# Patient Record
Sex: FEMALE | Race: WHITE | NOT HISPANIC OR LATINO
[De-identification: names, ages, dates, MRNs, and addresses within clinical notes are randomized per-mention and may not be internally consistent; named-entity substitution may affect disease eponyms.]

---

## 2019-07-26 ENCOUNTER — RECORDS - HEALTHEAST (OUTPATIENT)
Dept: ADMINISTRATIVE | Facility: OTHER | Age: 54
End: 2019-07-26

## 2019-07-26 ENCOUNTER — HOSPITAL ENCOUNTER (OUTPATIENT)
Dept: ULTRASOUND IMAGING | Facility: CLINIC | Age: 54
Discharge: HOME | End: 2019-07-26
Attending: PHYSICIAN ASSISTANT

## 2019-07-26 DIAGNOSIS — S89.90XA INJURY OF CALF: ICD-10-CM

## 2024-12-07 ENCOUNTER — HEALTH MAINTENANCE LETTER (OUTPATIENT)
Age: 59
End: 2024-12-07

## 2024-12-13 ENCOUNTER — HOSPITAL ENCOUNTER (EMERGENCY)
Facility: CLINIC | Age: 59
Discharge: HOME | End: 2024-12-13
Attending: EMERGENCY MEDICINE | Admitting: EMERGENCY MEDICINE
Payer: COMMERCIAL

## 2024-12-13 VITALS
OXYGEN SATURATION: 99 % | SYSTOLIC BLOOD PRESSURE: 162 MMHG | HEART RATE: 50 BPM | TEMPERATURE: 97.9 F | RESPIRATION RATE: 20 BRPM | DIASTOLIC BLOOD PRESSURE: 71 MMHG

## 2024-12-13 DIAGNOSIS — L28.2 PRURITIC RASH: ICD-10-CM

## 2024-12-13 PROBLEM — I25.10 PRECLINICAL CORONARY ARTERY DISEASE: Status: ACTIVE | Noted: 2018-11-23

## 2024-12-13 PROBLEM — Z86.19 HX OF COLD SORES: Status: ACTIVE | Noted: 2020-01-21

## 2024-12-13 PROBLEM — E78.00 PURE HYPERCHOLESTEROLEMIA: Chronic | Status: ACTIVE | Noted: 2018-11-06

## 2024-12-13 PROBLEM — N95.2 VAGINAL ATROPHY: Status: ACTIVE | Noted: 2021-02-18

## 2024-12-13 PROCEDURE — 250N000013 HC RX MED GY IP 250 OP 250 PS 637: Performed by: EMERGENCY MEDICINE

## 2024-12-13 PROCEDURE — 250N000012 HC RX MED GY IP 250 OP 636 PS 637: Performed by: EMERGENCY MEDICINE

## 2024-12-13 PROCEDURE — 99284 EMERGENCY DEPT VISIT MOD MDM: CPT

## 2024-12-13 RX ORDER — HYDROXYZINE HYDROCHLORIDE 25 MG/1
50 TABLET, FILM COATED ORAL ONCE
Status: COMPLETED | OUTPATIENT
Start: 2024-12-13 | End: 2024-12-13

## 2024-12-13 RX ORDER — PREDNISONE 20 MG/1
60 TABLET ORAL ONCE
Status: COMPLETED | OUTPATIENT
Start: 2024-12-13 | End: 2024-12-13

## 2024-12-13 RX ORDER — PREDNISONE 20 MG/1
TABLET ORAL
Qty: 10 TABLET | Refills: 0 | Status: SHIPPED | OUTPATIENT
Start: 2024-12-13

## 2024-12-13 RX ORDER — PRAVASTATIN SODIUM 20 MG
20 TABLET ORAL AT BEDTIME
COMMUNITY

## 2024-12-13 RX ORDER — LEVOCETIRIZINE DIHYDROCHLORIDE 5 MG/1
5 TABLET, FILM COATED ORAL ONCE
Status: COMPLETED | OUTPATIENT
Start: 2024-12-13 | End: 2024-12-13

## 2024-12-13 RX ORDER — HYDROXYZINE HYDROCHLORIDE 25 MG/1
50 TABLET, FILM COATED ORAL EVERY 6 HOURS PRN
Qty: 30 TABLET | Refills: 0 | Status: SHIPPED | OUTPATIENT
Start: 2024-12-13

## 2024-12-13 RX ADMIN — LEVOCETIRIZINE DIHYDROCHLORIDE 5 MG: 5 TABLET ORAL at 02:44

## 2024-12-13 RX ADMIN — PREDNISONE 60 MG: 20 TABLET ORAL at 02:44

## 2024-12-13 RX ADMIN — HYDROXYZINE HYDROCHLORIDE 50 MG: 25 TABLET ORAL at 02:44

## 2024-12-13 ASSESSMENT — COLUMBIA-SUICIDE SEVERITY RATING SCALE - C-SSRS
1. IN THE PAST MONTH, HAVE YOU WISHED YOU WERE DEAD OR WISHED YOU COULD GO TO SLEEP AND NOT WAKE UP?: NO
6. HAVE YOU EVER DONE ANYTHING, STARTED TO DO ANYTHING, OR PREPARED TO DO ANYTHING TO END YOUR LIFE?: NO
2. HAVE YOU ACTUALLY HAD ANY THOUGHTS OF KILLING YOURSELF IN THE PAST MONTH?: NO

## 2024-12-13 NOTE — ED TRIAGE NOTES
Pt woke up around 2300 with swelling to her eyes and cheeks, noticed that her face was itchy. States that she's had a cold since Tuesday and noticed her neck felt itchy yesterday (external, not internal). Denies any SOB or changes to her voice, no GI sxs. No new medications, lotions or detergents.     Pt took benedryl 50mg around 2330 and states that her eyes feel less swollen now.     ABCs intact.     Triage Assessment (Adult)       Row Name 12/13/24 0150          Triage Assessment    Airway WDL WDL        Respiratory WDL    Respiratory WDL WDL        Skin Circulation/Temperature WDL    Skin Circulation/Temperature WDL X  redness to cheeks and eyes        Cardiac WDL    Cardiac WDL WDL        Peripheral/Neurovascular WDL    Peripheral Neurovascular WDL WDL        Cognitive/Neuro/Behavioral WDL    Cognitive/Neuro/Behavioral WDL WDL

## 2024-12-13 NOTE — ED PROVIDER NOTES
EMERGENCY DEPARTMENT ENCOUNTER      NAME: Susan Pantoja  AGE: 59 year old female  YOB: 1965  MRN: [id number]  EVALUATION DATE & TIME: No admission date for patient encounter.    PCP: Teresa Segura    ED PROVIDER: Norris Licona M.D.      Chief Complaint   Patient presents with    Allergic Reaction         IMPRESSION  1. Pruritic rash        PLAN  - prednisone 40mg daily x5 days  - Benadryl/Atarax for itching  - close PCP follow up  - discharge to home    ED COURSE & MEDICAL DECISION MAKING    ED Course as of 12/13/24 0243   Fri Dec 13, 2024   0217 Patient seen in the waiting room due to boarding crisis.   0230 59yoF with history of HLD presenting for evaluation of itchy rash to her face & neck. Reports several days of mild URI symptoms and took cold medicine earlier this evening. No new foods, soaps, detergents. Awoke tonight with new itchy rash to her face & neck; no difficulty breathing or swallowing. Took Benadryl with mild relief. Called RN line and directed to the ED.    Mild erythema to cheeks & anterior neck with no clear hives, no angioedema, handling secretions without difficulty, clear lungs, normal work of breathing, benign abdomen, no meningismus, overall well-appearing.    Suspect allergic reaction; does not meet anaphylaxis criteria. Given prednisone, Atarax, Xyzal here now with improvement; will continue as outpatient. Patient able to tolerate PO and walk without difficulty. Patient comfortable with discharge at this time. Return precautions and need for PCP follow up discussed and understood. No further questions at the time of discharge.       --------------------------------------------------------------------------------   --------------------------------------------------------------------------------     2:20 AM I met with the patient for the initial interview and physical examination. Discussed plan for treatment and workup in the ED. We discussed plan  for discharge and all questions were answered.         This patient involved a high degree of complexity in medical decision making, as significant risks were present and assessed. Recent encounters & results in medical record reviewed by me.    All workup (i.e. any EKG/labs/imaging as per charting below) reviewed and independently interpreted by me. See respective sections for details.    Broad differential considered for this patient, including but not limited to:  allergic reaction, anaphylaxis, scabies, cellulitis, necrotizing fasciitis, sepsis, contact dermatitis      See additional MDM below if interested.      MEDICATIONS GIVEN IN THE EMERGENCY DEPARTMENT  Medications   levocetirizine (XYZAL) tablet 5 mg (has no administration in time range)   predniSONE (DELTASONE) tablet 60 mg (has no administration in time range)   hydrOXYzine HCl (ATARAX) tablet 50 mg (has no administration in time range)       NEW PRESCRIPTIONS STARTED AT TODAY'S ER VISIT  Current Discharge Medication List        START taking these medications    Details   hydrOXYzine HCl (ATARAX) 25 MG tablet Take 2 tablets (50 mg) by mouth every 6 hours as needed for itching.  Qty: 30 tablet, Refills: 0      predniSONE (DELTASONE) 20 MG tablet Take two tablets (= 40mg) each day for 5 (five) days  Qty: 10 tablet, Refills: 0           CONTINUE these medications which have NOT CHANGED    Details   pravastatin (PRAVACHOL) 20 MG tablet Take 20 mg by mouth at bedtime.                 =================================================================      HPI  Use of : N/A       Susanlevy Pantoja is a 59 year old female with a pertinent history of hypercholesterolemia who presents to this ED via walk-in for evaluation of allergic reaction.    This evening, the patient started feeling neck itchiness. Around 7 PM, she went to bed after showering, but then woke around 11 PM with itchiness to her neck, facial cheeks and eyes. She looked in a mirror  and noted that her face was red and flushed, so she used benadryl. She called the nurse line and was advised to present to the ER. Currently, she still endorses facial and neck itchiness, but denies difficulty breathing or swallowing and other itching.    The patient applied no creams to her face. On 10-Dec-2024, the patient developed a cold, and started taking cold medicine for it; otherwise she had no new food or exposures this evening. She has no known allergy and no history of allergic reaction.       --------------- MEDICAL HISTORY ---------------  PAST MEDICAL HISTORY:  Reviewed independently by me.  History reviewed. No pertinent past medical history.  Patient Active Problem List   Diagnosis    Hx of cold sores    Menopausal symptoms    Preclinical coronary artery disease    Pure hypercholesterolemia    Vaginal atrophy       PAST SURGICAL HISTORY:  Reviewed independently by me.  History reviewed. No pertinent surgical history.    CURRENT MEDICATIONS:    Reviewed independently by me.    Current Facility-Administered Medications:     hydrOXYzine HCl (ATARAX) tablet 50 mg, 50 mg, Oral, Once, Norris Licona MD    levocetirizine (XYZAL) tablet 5 mg, 5 mg, Oral, Once, Norris Licona MD    predniSONE (DELTASONE) tablet 60 mg, 60 mg, Oral, Once, Norris Licona MD    Current Outpatient Medications:     hydrOXYzine HCl (ATARAX) 25 MG tablet, Take 2 tablets (50 mg) by mouth every 6 hours as needed for itching., Disp: 30 tablet, Rfl: 0    predniSONE (DELTASONE) 20 MG tablet, Take two tablets (= 40mg) each day for 5 (five) days, Disp: 10 tablet, Rfl: 0    pravastatin (PRAVACHOL) 20 MG tablet, Take 20 mg by mouth at bedtime., Disp: , Rfl:     ALLERGIES:  Reviewed independently by me.  No Known Allergies    FAMILY HISTORY:  Reviewed independently by me.  History reviewed. No pertinent family history.      SOCIAL HISTORY:   Reviewed independently by me.  Social History     Socioeconomic History    Marital status:       Social Drivers of Health     Financial Resource Strain: Low Risk  (10/21/2024)    Received from [facility]    Financial Resource Strain     Difficulty of Paying Living Expenses: 3   Food Insecurity: No Food Insecurity (10/21/2024)    Received from [facility]    Food Insecurity     Do you worry your food will run out before you are able to buy more?: 1   Transportation Needs: No Transportation Needs (10/21/2024)    Received from [facility]    Transportation Needs     Does lack of transportation keep you from medical appointments?: 1     Does lack of transportation keep you from work, meetings or getting things that you need?: 1   Social Connections: Socially Integrated (10/21/2024)    Received from [facility]    Social Connections     Do you often feel lonely or isolated from those around you?: 0   Housing Stability: Low Risk  (10/21/2024)    Received from [facility]    Housing Stability     What is your housing situation today?: 1       --------------- PHYSICAL EXAM ---------------  Nursing notes and vitals independently reviewed by me.  VITALS:  Vitals:    12/13/24 0149   BP: (!) 162/71   Pulse: 50   Resp: 20   Temp: 97.9  F (36.6  C)   TempSrc: Oral   SpO2: 99%       PHYSICAL EXAM:    General:  alert, interactive, no distress  Eyes:  conjunctivae clear, conjugate gaze  HENT:  atraumatic, nose with no rhinorrhea, oropharynx clear, no angioedema  - mild erythematous rash to face & neck with no clear hives, no blistering, no skin tenderness, no crepitus  Neck:  no meningismus  Cardiovascular:  HR 50s during exam, regular rhythm, no murmurs, brisk cap refill  Chest:  no chest wall tenderness  Pulmonary:  no stridor, normal phonation, normal work of breathing, clear lungs bilaterally  Abdomen:  soft, nondistended, nontender  :  no CVA  tenderness  Back:  no midline spinal tenderness  Musculoskeletal:  no pretibial edema, no calf tenderness. Gross ROM intact to joints of extremities with no obvious deformities.  Skin:  warm, dry  Neuro:  awake, alert, answers questions appropriately, follows commands, moves all limbs  Psych:  calm, normal affect            --------------- ADDITIONAL MDM ---------------  MIPS:  Not Applicable    History:  - I considered systemic symptoms of the presenting illness.  - Supplemental history from:       -- patient  - External Record(s) reviewed:       -- Inpatient/outpatient record (clinic visit 10/21/24), prior labs (blood 12/28/23), prior imaging (CXR 7/19/21)       -- see above ED course & MDM for further details    Workup:  - Chart documentation above includes differential considered and my independent interpretation any EKGs, labs tests, and/or imaging  - emergent/severe conditions considered and evaluated for: see above differential & MDM  - In additional to work up documented, I considered the following work up:       -- CT neck, blood       -- see above ED course & MDM for further details    External Consultation:  - Discussion of management with another provider:       -- ED pharmacist re: meds       -- see above charting for additional    Complicating Factors:  - Care impacted by chronic illness:       -- see above MDM, past medical history, & problem list    Disposition Considerations:  - Discharge       -- I considered escalation of care with admission to the hospital, but ultimately discharged the patient given reassuring exam, comfortable with discharge home       -- I recommended the patient continue their current prescription strength medication(s) as charted above in current medications list       -- I prescribed prescription strength medication(s) as charted above       -- I recommended over-the-counter medication(s) as charted above & in discharge instructions       I, Diony Faria, am serving as a  scribe to document services personally performed by Dr. Norris Licona based on my observation and the provider's statements to me. I, Norris Licona MD attest that Diony Faria is acting in a scribe capacity, has observed my performance of the services and has documented them in accordance with my direction.      Norris Licona MD  12/13/24  Emergency Medicine  [facility] EMERGENCY ROOM  [address] [place] [id number]  [phone]  Dept: [phone]     Norris Licona MD  12/13/24 0246

## 2024-12-13 NOTE — DISCHARGE INSTRUCTIONS
Take the prednisone to help reduce the inflammation.    Use over-the-counter Benadryl or the prescribed Atarax as needed for itching.    Follow up with your Primary Care provider in 2 days for a recheck.    Return to the Emergency Department for any difficulty breathing or swallowing, severe worsening, or any other concerns.

## 2024-12-13 NOTE — ED NOTES
Pt remains A&O, still maintaining ABCs and independently ambulatory. She declines discharge vitals at this time; redness to cheeks appears somewhat improved after PTA benedryl and ice packs while in ED. Continues to deny any GI sxs, worsening throat itching, or hives.